# Patient Record
Sex: FEMALE | Race: WHITE | Employment: UNEMPLOYED | URBAN - METROPOLITAN AREA
[De-identification: names, ages, dates, MRNs, and addresses within clinical notes are randomized per-mention and may not be internally consistent; named-entity substitution may affect disease eponyms.]

---

## 2019-06-03 ENCOUNTER — HOSPITAL ENCOUNTER (EMERGENCY)
Age: 29
Discharge: HOME | End: 2019-06-03
Payer: MEDICAID

## 2019-06-03 VITALS
RESPIRATION RATE: 14 BRPM | DIASTOLIC BLOOD PRESSURE: 77 MMHG | WEIGHT: 131.39 LBS | OXYGEN SATURATION: 97 % | HEART RATE: 84 BPM | HEIGHT: 66 IN | BODY MASS INDEX: 21.12 KG/M2 | TEMPERATURE: 98.8 F | SYSTOLIC BLOOD PRESSURE: 117 MMHG

## 2019-06-03 DIAGNOSIS — K02.9 DENTAL CARIES: Primary | ICD-10-CM

## 2019-06-03 DIAGNOSIS — L30.9 DERMATITIS: ICD-10-CM

## 2019-06-03 PROCEDURE — 99282 EMERGENCY DEPT VISIT SF MDM: CPT

## 2019-06-03 RX ORDER — CLINDAMYCIN HYDROCHLORIDE 150 MG/1
300 CAPSULE ORAL 3 TIMES DAILY
Qty: 60 CAPSULE | Refills: 0 | Status: SHIPPED | OUTPATIENT
Start: 2019-06-03 | End: 2019-06-13

## 2019-06-03 RX ORDER — PERMETHRIN 50 MG/G
CREAM TOPICAL
Qty: 60 G | Refills: 1 | Status: SHIPPED | OUTPATIENT
Start: 2019-06-03 | End: 2020-03-04

## 2019-06-03 RX ORDER — PREDNISONE 10 MG/1
20 TABLET ORAL DAILY
Qty: 10 TABLET | Refills: 0 | Status: SHIPPED | OUTPATIENT
Start: 2019-06-03 | End: 2019-06-08

## 2019-06-03 RX ORDER — CHLORHEXIDINE GLUCONATE 0.12 MG/ML
15 RINSE ORAL 2 TIMES DAILY
Qty: 420 ML | Refills: 0 | Status: SHIPPED | OUTPATIENT
Start: 2019-06-03 | End: 2019-06-17

## 2019-06-03 ASSESSMENT — PAIN DESCRIPTION - LOCATION
LOCATION: TEETH
LOCATION: TEETH

## 2019-06-03 ASSESSMENT — PAIN SCALES - GENERAL
PAINLEVEL_OUTOF10: 6

## 2019-06-03 ASSESSMENT — PAIN DESCRIPTION - PAIN TYPE
TYPE: ACUTE PAIN
TYPE: ACUTE PAIN

## 2019-06-03 ASSESSMENT — PAIN DESCRIPTION - DESCRIPTORS: DESCRIPTORS: THROBBING

## 2019-06-03 ASSESSMENT — ENCOUNTER SYMPTOMS
RESPIRATORY NEGATIVE: 1
GASTROINTESTINAL NEGATIVE: 1

## 2019-06-03 ASSESSMENT — PAIN DESCRIPTION - ORIENTATION: ORIENTATION: LEFT;UPPER

## 2019-06-03 ASSESSMENT — PAIN - FUNCTIONAL ASSESSMENT: PAIN_FUNCTIONAL_ASSESSMENT: 0-10

## 2019-06-03 NOTE — ED PROVIDER NOTES
[id number] [facility]  eMERGENCY dEPARTMENT eNCOUnter    Pt Name: @@  MRN: [id number]  Birthdate1990  Date of evaluation: 6/3/2019  Provider: KAYLAH Hartman CNP     Evaluated by the advanced practice provider    [address] [place] [address]       Chief Complaint   Patient presents with    Rash     right hand, left posterior neck, scattered on right arm; itches    Dental Pain     left upper         HISTORY OF PRESENT ILLNESS  (Location/Symptom, Timing/Onset, Context/Setting, Quality, Duration, Modifying Factors, Severity.)   Good Sams is a 34 y.o. female who presents to the emergencydepartment complains of left upper tooth pain for a couple of days. Second complaint: Itchy rash, started on the right hand and is starting to spread. She's been outside playing with her daughter a lot lately. Nobody else in the house has a rash or been ill other than the daughter having conjunctivitis and ear infection receiving antibiotics. She denies any travel. She has some medication allergies. Reports that she sensitive to poison ivy, poison oak. Also reports that she sensitive to chigger bites. Rash on the dorsal surface of the right hand started about 2 weeks ago. No recent travel. No recent visitors within the home. Denies any concerns for bed bug or insect bites otherwise. Denies cough cold fever or chills       Nursing Notes were reviewed and I agree. REVIEW OF SYSTEMS    (2-9 systems for level 4, 10 or more for level 5)     Review of Systems   Constitutional: Negative. HENT: Positive for dental problem. Respiratory: Negative. Cardiovascular: Negative. Gastrointestinal: Negative. Genitourinary: Negative. Skin: Positive for rash. Neurological: Negative. Except as noted above the remainder of the review of systems was reviewed and negative.        PAST MEDICAL HISTORY         Diagnosis Date    ADHD (attention deficit hyperactivity disorder)     Asthma     [facility]     Pneumonia     Tourette's        SURGICAL HISTORY           Procedure Laterality Date    APPENDECTOMY         CURRENT MEDICATIONS       Previous Medications    PRENATAL MULTIVIT-MIN-FE-FA (PRE- FORMULA) TABS    Take 1 tablet by mouth daily       ALLERGIES     Codeine and Pcn [penicillins]    FAMILY HISTORY           Problem Relation Age of Onset    Diabetes Father     Diabetes Maternal Aunt     Diabetes Maternal Uncle     Cancer Paternal Uncle      Family Status   Relation Name Status    Mother      Father  Alive    [facility]  (Not Specified)    [facility]  Alive        SOCIAL HISTORY      reports that she has been smoking cigarettes. She has been smoking about 0.33 packs per day. She has never used smokeless tobacco. She reports that she does not drink alcohol or use drugs. PHYSICAL EXAM    (up to 7 for level 4, 8 or more for level 5)      ED Triage Vitals [19 1534]   BP Temp Temp Source Pulse Resp SpO2 Height Weight   117/77 98.8 °F (37.1 °C) Oral 84 14 97 % 5' 6\" (1.676 m) 131 lb 6.3 oz (59.6 kg)       Physical Exam   Constitutional: Vital signs are normal. She appears well-developed and well-nourished. She is cooperative. Non-toxic appearance. She does not have a sickly appearance. She does not appear ill. No distress. HENT:   Head: Normocephalic and atraumatic. Mouth/Throat: Uvula is midline, oropharynx is clear and moist and mucous membranes are normal. No oral lesions. Dental caries present. No dental abscesses. Eyes: Pupils are equal, round, and reactive to light. Neck: Normal range of motion. Neck supple. Cardiovascular: Normal rate and regular rhythm. Pulmonary/Chest: Effort normal.   Neurological: She is alert. Skin: Skin is warm and dry. Capillary refill takes less than 2 seconds. Rash noted. Rash is maculopapular. She is not diaphoretic. Clusters of macular papular skin eruptions on the dorsal surface of the right hand. Primarily coalescing around the second and third MCP joints. She also has a small coalescence of the same eruptions on the extensor surface of the right forearm. There is a small collection of these eruptions on the left external ear lobe. She has a few scattered satellite eruptions on the right forearm and right shoulder. No evidence of pustules. They're mildly tender to the touch erythematous and christy. No evidence of vesicles. Evidence of blisters otherwise. The satellite eruptions measure approximately 2-3 mm. The areas that coalesce seemed to have individual or eruptions that measure 2-3 mm. There does not appear to be any scaling or plaquing and/or umbilication. No eruptions in the oral pharynx area did no eruptions on the palms of the hands. Does not appear to actually involve the web spaces between the fingers. Does not involve the feet chest abdomen or lower extremity. Does not involve the scalp. Nursing note and vitals reviewed. DIAGNOSTIC RESULTS     RADIOLOGY:   Non-plain film images such as CT, Ultrasound and MRI are read by the radiologist. Plain radiographic images are visualized and preliminarily interpreted by KAYLAH Bone - TARIQ with the below findings:        Interpretation per the Radiologist below, if available at the time of this note:    No orders to display       LABS:  Labs Reviewed - No data to display    All other labs were within normal range or not returned as of this dictation. EMERGENCY DEPARTMENT COURSE and DIFFERENTIAL DIAGNOSIS/MDM:   Vitals:    Vitals:    06/03/19 1534   BP: 117/77   Pulse: 84   Resp: 14   Temp: 98.8 °F (37.1 °C)   TempSrc: Oral   SpO2: 97%   Weight: 131 lb 6.3 oz (59.6 kg)   Height: 5' 6\" (1.676 m)       MDM    Patient was seen and evaluated per myself. Dr. Karrie Estrada present available for consultation as needed. Differential diagnosis regarding the rash: Insect bites, scabies, Eleni dermatitis 2/2 poison sumac, poison oak, poison IVY.

## 2020-03-04 ENCOUNTER — APPOINTMENT (OUTPATIENT)
Dept: GENERAL RADIOLOGY | Age: 30
End: 2020-03-04
Payer: MEDICAID

## 2020-03-04 ENCOUNTER — HOSPITAL ENCOUNTER (EMERGENCY)
Age: 30
Discharge: HOME | End: 2020-03-04
Attending: EMERGENCY MEDICINE
Payer: MEDICAID

## 2020-03-04 ENCOUNTER — APPOINTMENT (OUTPATIENT)
Dept: CT IMAGING | Age: 30
End: 2020-03-04
Payer: MEDICAID

## 2020-03-04 VITALS
TEMPERATURE: 97.7 F | OXYGEN SATURATION: 100 % | WEIGHT: 132.72 LBS | SYSTOLIC BLOOD PRESSURE: 118 MMHG | HEART RATE: 76 BPM | RESPIRATION RATE: 20 BRPM | DIASTOLIC BLOOD PRESSURE: 80 MMHG | BODY MASS INDEX: 21.42 KG/M2

## 2020-03-04 LAB — HCG(URINE) PREGNANCY TEST: NEGATIVE

## 2020-03-04 PROCEDURE — 99284 EMERGENCY DEPT VISIT MOD MDM: CPT

## 2020-03-04 PROCEDURE — 84703 CHORIONIC GONADOTROPIN ASSAY: CPT

## 2020-03-04 PROCEDURE — 6370000000 HC RX 637 (ALT 250 FOR IP): Performed by: EMERGENCY MEDICINE

## 2020-03-04 PROCEDURE — 73552 X-RAY EXAM OF FEMUR 2/>: CPT

## 2020-03-04 PROCEDURE — 70450 CT HEAD/BRAIN W/O DYE: CPT

## 2020-03-04 RX ORDER — DOXYCYCLINE HYCLATE 100 MG
100 TABLET ORAL ONCE
Status: COMPLETED | OUTPATIENT
Start: 2020-03-04 | End: 2020-03-04

## 2020-03-04 RX ORDER — DOXYCYCLINE 100 MG/1
100 TABLET ORAL 2 TIMES DAILY
Qty: 14 TABLET | Refills: 0 | Status: SHIPPED | OUTPATIENT
Start: 2020-03-04 | End: 2020-03-11

## 2020-03-04 RX ORDER — ACETAMINOPHEN 325 MG/1
650 TABLET ORAL ONCE
Status: COMPLETED | OUTPATIENT
Start: 2020-03-04 | End: 2020-03-04

## 2020-03-04 RX ADMIN — ACETAMINOPHEN 650 MG: 325 TABLET ORAL at 02:34

## 2020-03-04 RX ADMIN — DOXYCYCLINE HYCLATE 100 MG: 100 TABLET, COATED ORAL at 04:19

## 2020-03-04 ASSESSMENT — ENCOUNTER SYMPTOMS
COLOR CHANGE: 0
FACIAL SWELLING: 0
ABDOMINAL PAIN: 0
NAUSEA: 0
SHORTNESS OF BREATH: 0
VOMITING: 0
WHEEZING: 0
TROUBLE SWALLOWING: 0
VOICE CHANGE: 0
STRIDOR: 0

## 2020-03-04 ASSESSMENT — PAIN DESCRIPTION - PAIN TYPE
TYPE: ACUTE PAIN

## 2020-03-04 ASSESSMENT — PAIN DESCRIPTION - ORIENTATION
ORIENTATION: RIGHT;POSTERIOR;UPPER
ORIENTATION: RIGHT;POSTERIOR;UPPER
ORIENTATION: RIGHT;UPPER

## 2020-03-04 ASSESSMENT — PAIN - FUNCTIONAL ASSESSMENT: PAIN_FUNCTIONAL_ASSESSMENT: 0-10

## 2020-03-04 ASSESSMENT — PAIN DESCRIPTION - DESCRIPTORS
DESCRIPTORS: ACHING;BURNING;CONSTANT
DESCRIPTORS: ACHING;BURNING
DESCRIPTORS: ACHING;BURNING

## 2020-03-04 ASSESSMENT — PAIN SCALES - GENERAL
PAINLEVEL_OUTOF10: 9
PAINLEVEL_OUTOF10: 9
PAINLEVEL_OUTOF10: 10
PAINLEVEL_OUTOF10: 9

## 2020-03-04 ASSESSMENT — PAIN DESCRIPTION - LOCATION: LOCATION: LEG

## 2020-03-04 ASSESSMENT — PAIN DESCRIPTION - FREQUENCY
FREQUENCY: CONTINUOUS
FREQUENCY: CONTINUOUS
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
## 2020-03-04 NOTE — ED NOTES
Requesting a pregnancy test-done  Walks back to room with very pronounced limp  Confirms that she is going to address listed on run sheet      Didier Nunez RN  03/04/20 5704

## 2020-03-04 NOTE — ED NOTES
Warmed blankets applied and made sure pt had the phone and call light  Within reach     Lola Hurtado, AMEENA  03/04/20 8437

## 2022-06-22 ENCOUNTER — HOSPITAL ENCOUNTER (EMERGENCY)
Age: 32
Discharge: HOME | End: 2022-06-22
Attending: EMERGENCY MEDICINE
Payer: MEDICAID

## 2022-06-22 ENCOUNTER — APPOINTMENT (OUTPATIENT)
Dept: GENERAL RADIOLOGY | Age: 32
End: 2022-06-22
Payer: MEDICAID

## 2022-06-22 VITALS
HEART RATE: 87 BPM | RESPIRATION RATE: 15 BRPM | OXYGEN SATURATION: 99 % | SYSTOLIC BLOOD PRESSURE: 130 MMHG | TEMPERATURE: 98.9 F | DIASTOLIC BLOOD PRESSURE: 75 MMHG

## 2022-06-22 DIAGNOSIS — S99.921A TOE INJURY, RIGHT, INITIAL ENCOUNTER: Primary | ICD-10-CM

## 2022-06-22 PROCEDURE — 99283 EMERGENCY DEPT VISIT LOW MDM: CPT

## 2022-06-22 PROCEDURE — 6370000000 HC RX 637 (ALT 250 FOR IP): Performed by: EMERGENCY MEDICINE

## 2022-06-22 PROCEDURE — 73630 X-RAY EXAM OF FOOT: CPT

## 2022-06-22 RX ORDER — HYDROCODONE BITARTRATE AND ACETAMINOPHEN 5; 325 MG/1; MG/1
1 TABLET ORAL ONCE
Status: COMPLETED | OUTPATIENT
Start: 2022-06-22 | End: 2022-06-22

## 2022-06-22 RX ADMIN — HYDROCODONE BITARTRATE AND ACETAMINOPHEN 1 TABLET: 5; 325 TABLET ORAL at 19:17

## 2022-06-22 ASSESSMENT — PAIN DESCRIPTION - PAIN TYPE: TYPE: ACUTE PAIN

## 2022-06-22 ASSESSMENT — PAIN - FUNCTIONAL ASSESSMENT
PAIN_FUNCTIONAL_ASSESSMENT: 0-10
PAIN_FUNCTIONAL_ASSESSMENT: ACTIVITIES ARE NOT PREVENTED
PAIN_FUNCTIONAL_ASSESSMENT: NONE - DENIES PAIN

## 2022-06-22 ASSESSMENT — PAIN DESCRIPTION - DESCRIPTORS: DESCRIPTORS: THROBBING

## 2022-06-22 ASSESSMENT — PAIN DESCRIPTION - ONSET: ONSET: GRADUAL

## 2022-06-22 ASSESSMENT — PAIN DESCRIPTION - ORIENTATION: ORIENTATION: LEFT

## 2022-06-22 ASSESSMENT — PAIN SCALES - GENERAL
PAINLEVEL_OUTOF10: 9
PAINLEVEL_OUTOF10: 5
PAINLEVEL_OUTOF10: 10

## 2022-06-22 ASSESSMENT — PAIN DESCRIPTION - FREQUENCY: FREQUENCY: CONTINUOUS

## 2022-06-22 NOTE — Clinical Note
Gali Morales was seen and treated in our emergency department on 6/22/2022. She may return to work on 06/24/2022. If you have any questions or concerns, please don't hesitate to call.       Kate Collins MD

## 2022-06-22 NOTE — ED PROVIDER NOTES
[id number] [facility]      CHIEF COMPLAINT  Toe Pain (pt jammed toe when she was on a swing she also has an abrasion to her toe )       HISTORY OF PRESENT ILLNESS  Waylon Martel is a 29 y.o. female who presents to the emergency department for evaluation of toe injury. Patient reports she was on a swing with her kids today when she tried to come to a complete stop and stubbed her right fifth toe against a piece of metal that was in the sand. Says this occurred around 3 PM and since then, she has been having increasing throbbing pain. Says the pain is worse when she tries to put weight onto the right fifth toe. She finally went to an urgent care center where they said they were concerned that there was bone sticking out and that she needed to come into an emergency department. Denies any other injuries. Reports her last Tdap was 3 years ago. No other complaints, modifying factors or associated symptoms. I have reviewed the following from the nursing documentation.     Past Medical History:   Diagnosis Date    ADHD (attention deficit hyperactivity disorder)     Asthma     Bipolar 1 disorder ([facility]     Pneumonia     Tourette's      Past Surgical History:   Procedure Laterality Date    APPENDECTOMY       Family History   Problem Relation Age of Onset    Diabetes Father     Diabetes Maternal Aunt     Diabetes Maternal Uncle     Cancer Paternal Uncle      Social History     Socioeconomic History    Marital status: Single     Spouse name: Not on file    Number of children: Not on file    Years of education: Not on file    Highest education level: Not on file   Occupational History    Not on file   Tobacco Use    Smoking status: Current Every Day Smoker     Packs/day: 0.33     Types: Cigarettes    Smokeless tobacco: Never Used   Substance and Sexual Activity    Alcohol use: Yes     Comment: celebrating birthday [birth year]    Drug use: No    Sexual activity: Yes     Partners: Male Other Topics Concern    Not on file   Social History Narrative    Not on file     Social Determinants of Health     Financial Resource Strain:     Difficulty of Paying Living Expenses: Not on file   Food Insecurity:     Worried About Running Out of Food in the Last Year: Not on file    Miriam of Food in the Last Year: Not on file   Transportation Needs:     Lack of Transportation (Medical): Not on file    Lack of Transportation (Non-Medical): Not on file   Physical Activity:     Days of Exercise per Week: Not on file    Minutes of Exercise per Session: Not on file   Stress:     Feeling of Stress : Not on file   Social Connections:     Frequency of Communication with Friends and Family: Not on file    Frequency of Social Gatherings with Friends and Family: Not on file    Attends Hindu Services: Not on file    Active Member of [address] [place] [address] or Organizations: Not on file    Attends Club or Organization Meetings: Not on file    Marital Status: Not on file   Intimate Partner Violence:     Fear of Current or Ex-Partner: Not on file    Emotionally Abused: Not on file    Physically Abused: Not on file    Sexually Abused: Not on file   Housing Stability:     Unable to Pay for Housing in the Last Year: Not on file    Number of [place] in the Last Year: Not on file    Unstable Housing in the Last Year: Not on file     No current facility-administered medications for this encounter. No current outpatient medications on file. Allergies   Allergen Reactions    Codeine      Hives      Pcn [Penicillins]      Breathing difficulty         PMH, Surgical Hx, FH, Social Hx reviewed by myself. REVIEW OF SYSTEMS  10 systems reviewed, pertinent positives per HPI otherwise noted to be negative. PHYSICAL EXAM  /77   Pulse 89   Temp 98.9 °F (37.2 °C) (Oral)   Resp 14   SpO2 99%    GENERAL APPEARANCE: Awake and alert  HENT: Normocephalic. Atraumatic. EOMI. No facial droop.   LUNGS: Respirations unlabored. Speaking comfortably in full sentences. EXTREMITIES: 0.5cm superficial skin tear on the dorsum of the right 5th toe. No lacerations. No active bleeding. No obvious nailbed injury. Sensation intact to all extremities. Moving all extremities. SKIN: Warm and dry. No acute rashes. NEUROLOGICAL: Alert and oriented. No gross facial drooping. Answering questions appropriately. Moving all extremities. PSYCHIATRIC: Pleasant. Normal mood and affect. LABS  No results found for this visit on 06/22/22. I have reviewed all labs for this visit. RADIOLOGY  No results found. ED COURSE/MDM  Patient seen and evaluated. At presentation, patient was awake, alert, afebrile, hemodynamically stable, and satting well on room air. She was noted to have a very superficial, less than 0.5 cm skin tear on the dorsum of her right fifth toe. No open wounds. No lacerations present. Nailbed appears intact. X-ray obtained to assess for fracture or dislocation. Reports trying ibuprofen and Tylenol at home and still having 6 out of 10 pain. Discussed the benefits versus risks including the potential addictive nature of opioids and she wanted to get the dose of pain medicine while she was in the ED. She was given a dose of Norco.  Patient up-to-date on Tdap and does not require booster at today's visit. She was provided with crutches in the ED. She was instructed to weight-bear as tolerated. She was provided with wound care instructions. Discussed returning to the emergency department if any concern or signs of infection. She verbalized understanding. XR pending at discharge. On my review, I do not see obvious fracture. Awaiting official XR read, patient care handed off to Nhung Garner at shift change. this patient to be included in the SEP-1 Core Measure due to severe sepsis or septic shock?    No   Exclusion criteria - the patient is NOT to be included for SEP-1 Core Measure due to:  2+ SIRS criteria are not met     Pt was seen during the COVID 19 pandemic. Appropriate PPE worn by ME during patient encounters. Patient was cared for during a time with constrained hospital bed capacity with nationwide stress on resources and staffing. During the patient's ED course, the patient was given:  Medications   HYDROcodone-acetaminophen (NORCO) 5-325 MG per tablet 1 tablet (1 tablet Oral Given 6/22/22 1917)        CLINICAL IMPRESSION  1. Toe injury, right, initial encounter        Blood pressure 128/77, pulse 89, temperature 98.9 °F (37.2 °C), temperature source Oral, resp. rate 14, SpO2 99 %, not currently breastfeeding. DISPOSITION  Jana Murillo was discharged home in stable condition. Patient was given scripts for the following medications. I counseled patient how to take these medications. New Prescriptions    No medications on file       Follow-up with:  [facility]    Call   As needed      DISCLAIMER: This chart was created using Dragon dictation software. Efforts were made by me to ensure accuracy, however some errors may be present due to limitations of this technology and occasionally words are not transcribed correctly.         Antonio Amador MD  06/25/22 7119

## 2022-06-23 NOTE — ED PROVIDER NOTES
Level of involvement minor to minimal with this patient whose full physical exam and ED care given will be provided by Dr. Sina Conley. Dr. Sina Conley did asked me to monitor the x-ray results for this patient's right foot x-ray as there have been significant radiologic read delays this evening. We also discussed the eventual course of care for this patient based on radiologic findings. At this time, it is yet another hour and a half of monitoring. Patient's x-ray imaging became available at 18:26 and now it is nearly 21:00.    3 view right foot x-ray series: As read by myself in absence of radiologist, there is no acute bony fracture or dislocation or cortex abnormality or disruption at this time. As patient has been kept weeding a significant period of time, she is informed of the radiologic delayed again and that at this time there is no fracture seen on the unofficial read by myself. Therefore conservative home care now was recommended including nursing bandaging the patient's right toe abrasion, and patient being discharged home with crutches for conservative outpatient care to include keeping the area clean and dry and covered with a bandage at least a couple of times daily, and follow up with her family doctor. Return to the emergency department for any emergency worsening or concern. Patient verbalizes understanding and agreement with the above and is discharged as above. ICD-10-CM    1.  Toe injury, right, initial encounter  Barbie Islas PA-C  06/22/22 2059

## 2022-06-25 ENCOUNTER — HOSPITAL ENCOUNTER (OUTPATIENT)
Dept: GENERAL RADIOLOGY | Age: 32
Discharge: HOME | End: 2022-06-25
Payer: MEDICAID

## 2022-06-25 DIAGNOSIS — M79.671 RIGHT FOOT PAIN: ICD-10-CM

## 2022-06-25 PROCEDURE — 73630 X-RAY EXAM OF FOOT: CPT
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
